# Patient Record
Sex: FEMALE | Race: ASIAN | NOT HISPANIC OR LATINO | Employment: STUDENT | URBAN - METROPOLITAN AREA
[De-identification: names, ages, dates, MRNs, and addresses within clinical notes are randomized per-mention and may not be internally consistent; named-entity substitution may affect disease eponyms.]

---
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
## 2022-01-13 ENCOUNTER — OFFICE VISIT (OUTPATIENT)
Dept: GASTROENTEROLOGY | Facility: CLINIC | Age: 22
End: 2022-01-13

## 2022-01-13 VITALS
HEART RATE: 88 BPM | SYSTOLIC BLOOD PRESSURE: 144 MMHG | TEMPERATURE: 97.5 F | HEIGHT: 69 IN | DIASTOLIC BLOOD PRESSURE: 83 MMHG | WEIGHT: 205.4 LBS | BODY MASS INDEX: 30.42 KG/M2

## 2022-01-13 DIAGNOSIS — R19.7 DIARRHEA, UNSPECIFIED TYPE: Primary | ICD-10-CM

## 2022-01-13 DIAGNOSIS — K92.1 BLOOD IN STOOL: ICD-10-CM

## 2022-01-13 PROCEDURE — 99203 OFFICE O/P NEW LOW 30 MIN: CPT | Performed by: NURSE PRACTITIONER

## 2022-01-13 NOTE — PROGRESS NOTES
GASTROENTEROLOGY OFFICE NOTE  Karen John  [id number]  2000    CARE TEAM  Patient Care Team:  Julianne Alvarado DO as PCP - General (Internal Medicine)    Referring Provider: Julianne Alvarado DO    Chief Complaint   Patient presents with   • GI Bleeding   • Diarrhea        HISTORY OF PRESENT ILLNESS:  Karen is a 21-year-old female who presents with 3 week history of painless BRBPR occurring daily with every BM. Describes as significant amount of blood mixed within stool, in toilet bowel, and on toilet paper.  She reports that for the couple of weeks she was having soft formed bowel movements daily as is her usual but for the past week she has been having more diarrhea, 2-3 loose stools daily mixed with blood.  She denies abdominal pain/cramping, urgency, nausea, vomiting, or bloating.  She has had some weight loss over the past few months although she does not know how much she just realizes that her clothes are fitting differently.  She denies a family history of inflammatory bowel disease.    PAST MEDICAL HISTORY  History reviewed. No pertinent past medical history.     PAST SURGICAL HISTORY  History reviewed. No pertinent surgical history.     MEDICATIONS:    Current Outpatient Medications:   •  albuterol (ACCUNEB) 1.25 MG/3ML nebulizer solution, Take 3 mL by nebulization Every 6 (Six) Hours As Needed for Wheezing., Disp: 30 each, Rfl: 12  •  albuterol sulfate  (90 Base) MCG/ACT inhaler, Inhale 2 puffs Every 4 (Four) Hours As Needed for Wheezing., Disp: 8 g, Rfl: 11  •  FLUoxetine (PROzac) 20 MG tablet, Take 20 mg by mouth Daily., Disp: , Rfl:     ALLERGIES  No Known Allergies    FAMILY HISTORY:  Family History   Problem Relation Age of Onset   • Colon cancer Neg Hx    • Crohn's disease Neg Hx    • Stomach cancer Neg Hx    • Rectal cancer Neg Hx        SOCIAL HISTORY  Social History     Socioeconomic History   • Marital status: Single   Tobacco Use   • Smoking status: Never Smoker   • Smokeless  "tobacco: Never Used   Vaping Use   • Vaping Use: Never used   Substance and Sexual Activity   • Alcohol use: Never   • Drug use: Never   • Sexual activity: Defer         PHYSICAL EXAM   /83 (BP Location: Right arm, Patient Position: Sitting, Cuff Size: Adult)   Pulse 88   Temp 97.5 °F (36.4 °C) (Temporal)   Ht 175.3 cm (69\")   Wt 93.2 kg (205 lb 6.4 oz)   BMI 30.33 kg/m²   Physical Exam  Vitals and nursing note reviewed.   Constitutional:       Appearance: Normal appearance. She is well-developed.   HENT:      Head: Normocephalic and atraumatic.      Nose: Nose normal.      Mouth/Throat:      Mouth: Mucous membranes are moist.      Pharynx: Oropharynx is clear.   Eyes:      Pupils: Pupils are equal, round, and reactive to light.   Cardiovascular:      Rate and Rhythm: Normal rate and regular rhythm.   Pulmonary:      Effort: Pulmonary effort is normal.      Breath sounds: Normal breath sounds. No wheezing or rales.   Abdominal:      General: Bowel sounds are normal.      Palpations: Abdomen is soft. There is no mass.      Tenderness: There is no abdominal tenderness. There is no guarding or rebound.      Hernia: No hernia is present.   Musculoskeletal:         General: Normal range of motion.      Cervical back: Normal range of motion and neck supple.   Skin:     General: Skin is warm and dry.   Neurological:      Mental Status: She is alert and oriented to person, place, and time.      Cranial Nerves: No cranial nerve deficit.   Psychiatric:         Behavior: Behavior normal.         Judgment: Judgment normal.     Results Reviewed:  Results for RADHA CHINO (MRN [id number]) as of 1/18/2022 07:12   Ref. Range 12/21/2021 14:33   WBC Latest Ref Range: 3.40 - 10.80 10*3/mm3 11.09 (H)   RBC Latest Ref Range: 3.77 - 5.28 10*6/mm3 6.30 (H)   Hemoglobin Latest Ref Range: 12.0 - 15.9 g/dL 11.7 (L)   Hematocrit Latest Ref Range: 34.0 - 46.6 % 38.8   RDW Latest Ref Range: 12.3 - 15.4 % 17.9 (H)   MCV Latest Ref " Range: 79.0 - 97.0 fL 61.6 (L)   MCH Latest Ref Range: 26.6 - 33.0 pg 18.6 (L)   MCHC Latest Ref Range: 31.5 - 35.7 g/dL 30.2 (L)   MPV Latest Ref Range: 6.0 - 12.0 fL 11.0   Platelets Latest Ref Range: 140 - 450 10*3/mm3 294   RDW-SD Latest Ref Range: 37.0 - 54.0 fl 33.8 (L)     ASSESSMENT / PLAN  1.  Diarrhea  2.  Blood in stool  - Colonoscopy    Return for Follow up after procedures.    I discussed the patients findings and my recommendations with patient    Layton Nolasco APRN

## 2022-01-14 ENCOUNTER — PATIENT ROUNDING (BHMG ONLY) (OUTPATIENT)
Dept: GASTROENTEROLOGY | Facility: CLINIC | Age: 22
End: 2022-01-14

## 2022-01-14 NOTE — PROGRESS NOTES
January 14, 2022    Hello, may I speak with Karen John?    My name is lEle      I am  with [id number]  [facility]  [address] [address].    Before we get started may I verify your date of birth? 2000    I am calling to officially welcome you to our practice and ask about your recent visit. Is this a good time to talk? no          Thank you, and have a great day.

## 2022-01-31 RX ORDER — PEG-3350, SODIUM SULFATE, SODIUM CHLORIDE, POTASSIUM CHLORIDE, SODIUM ASCORBATE AND ASCORBIC ACID 7.5-2.691G
KIT ORAL
Qty: 1 EACH | Refills: 0 | Status: SHIPPED | OUTPATIENT
Start: 2022-01-31 | End: 2022-02-22 | Stop reason: SDUPTHER

## 2022-02-22 RX ORDER — PEG-3350, SODIUM SULFATE, SODIUM CHLORIDE, POTASSIUM CHLORIDE, SODIUM ASCORBATE AND ASCORBIC ACID 7.5-2.691G
KIT ORAL
Qty: 1 EACH | Refills: 0 | Status: SHIPPED | OUTPATIENT
Start: 2022-02-22